# Patient Record
(demographics unavailable — no encounter records)

---

## 2024-10-10 NOTE — HISTORY OF PRESENT ILLNESS
[FreeTextEntry1] : This 3-year-old male is here for evaluation of a fibrous bone lesion that was noted on a recent MRI.  The mother states that in January 2024 the child started limping without history of injury.  The MRI revealed a fibrous type lesion at the junction of the proximal and middle thirds of the femur.  There is no evidence of cortical involvement or fracture.  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  The patient is now asymptomatic and is able to run and jump without difficulty.

## 2024-10-10 NOTE — CONSULT LETTER
[Dear  ___] : Dear  [unfilled], [Consult Letter:] : I had the pleasure of evaluating your patient, [unfilled]. [Please see my note below.] : Please see my note below. [Consult Closing:] : Thank you very much for allowing me to participate in the care of this patient.  If you have any questions, please do not hesitate to contact me. [Sincerely,] : Sincerely, [FreeTextEntry3] : Dr Luu

## 2024-10-10 NOTE — DATA REVIEWED
[de-identified] : X-ray evaluation of the right femur on 10/9/2024 (AP and lateral views) reveals no change in size of this fibrous lesion which has scalloping but no evidence of pathologic fracture

## 2024-10-10 NOTE — ASSESSMENT
[FreeTextEntry1] : Benign bone tumor right femur  I have advised only observation although the mother was made aware that if the child begins limping or complaining of pain further x-rays will be necessary before the appointment which will be scheduled for approximately 4 months.  There has been a discussion concerning the possible enlargement of this tumor with the necessity for curettage and bone grafting of the lesion.  All questions have been answered.  Encounter time: 32 minutes

## 2024-10-10 NOTE — PHYSICAL EXAM
[FreeTextEntry1] : On physical examination the patient has a normal gait.  There is no tenderness in the region of the femur that has the lesion.  There is a full range of motion of the right hip knee ankle and subtalar joints.  There is no leg length inequality.

## 2025-01-29 NOTE — PHYSICAL EXAM
[FreeTextEntry1] : Exam today reveals a very minimal limp if any on the right side he has no evidence of swelling or overt inflammatory changes to the right hemipelvis hip and thigh.  He has good motion to the right hip slight restriction of full internal rotation and abduction as compared to the opposite side he has no tenderness over the femoral shaft buttock or trochanter.  Minimal discomfort over the anterior hip capsule.  All compartments are soft neurovascular status is intact.  X-rays ordered and taken today of the right hip 2 views reviewed and interpreted by myself reveal no new changes to pre-existing fibrous lesion once again noted

## 2025-01-29 NOTE — HISTORY OF PRESENT ILLNESS
[FreeTextEntry1] : This 3-year-old returns today for evaluation of his right hip and thigh region.  He has a history of a fibrous lesion previously noted on x-rays in the proximal third of the femur.  He had been doing well however he had a flulike illness last week and this past weekend he was noted to complain of pain and would not bear weight.  He was treated symptomatically at home and he started bearing weight as his week and ended into this early week.  He still has a very mild limp he has not had any swelling redness or fever.  His disposition is good he is eating well

## 2025-01-29 NOTE — ASSESSMENT
[FreeTextEntry1] : Impression: Benign lesion right femur, resolving synovitis right hip.  Will continue to be treated symptomatically with restricted playground activities on the order of another 3-5 days time.  He will return in 6 months for recheck of his fibrous lesion